# Patient Record
Sex: FEMALE | Race: WHITE | Employment: FULL TIME | URBAN - NONMETROPOLITAN AREA
[De-identification: names, ages, dates, MRNs, and addresses within clinical notes are randomized per-mention and may not be internally consistent; named-entity substitution may affect disease eponyms.]

---
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
## 2019-05-28 LAB — PHQ9 SCORE: 6

## 2019-06-27 ENCOUNTER — OFFICE VISIT (OUTPATIENT)
Dept: FAMILY MEDICINE | Facility: OTHER | Age: 19
End: 2019-06-27
Payer: COMMERCIAL

## 2019-06-27 VITALS
BODY MASS INDEX: 24.98 KG/M2 | RESPIRATION RATE: 16 BRPM | TEMPERATURE: 98.2 F | OXYGEN SATURATION: 99 % | WEIGHT: 141 LBS | DIASTOLIC BLOOD PRESSURE: 60 MMHG | HEIGHT: 63 IN | SYSTOLIC BLOOD PRESSURE: 94 MMHG | HEART RATE: 102 BPM

## 2019-06-27 DIAGNOSIS — R53.83 FATIGUE, UNSPECIFIED TYPE: ICD-10-CM

## 2019-06-27 DIAGNOSIS — Z00.00 ROUTINE GENERAL MEDICAL EXAMINATION AT A HEALTH CARE FACILITY: Primary | ICD-10-CM

## 2019-06-27 PROCEDURE — 99395 PREV VISIT EST AGE 18-39: CPT | Performed by: NURSE PRACTITIONER

## 2019-06-27 RX ORDER — OMEPRAZOLE 40 MG/1
1 CAPSULE, DELAYED RELEASE ORAL
Refills: 2 | COMMUNITY
Start: 2019-02-28

## 2019-06-27 RX ORDER — LORAZEPAM 0.5 MG/1
TABLET ORAL
Qty: 1 TABLET | Refills: 0 | COMMUNITY
Start: 2019-06-27

## 2019-06-27 ASSESSMENT — ENCOUNTER SYMPTOMS
HEARTBURN: 0
FEVER: 0
COUGH: 0
HEMATURIA: 0
DIZZINESS: 0
WEAKNESS: 0
SORE THROAT: 0
DYSURIA: 0
FREQUENCY: 0
ABDOMINAL PAIN: 0
CONSTIPATION: 0
PARESTHESIAS: 0
HEADACHES: 0
MYALGIAS: 0
HEMATOCHEZIA: 0
JOINT SWELLING: 0
NERVOUS/ANXIOUS: 0
PALPITATIONS: 0
FATIGUE: 1
DIARRHEA: 0
CHILLS: 0
EYE PAIN: 0
BREAST MASS: 0
SHORTNESS OF BREATH: 0
NAUSEA: 0
ARTHRALGIAS: 0

## 2019-06-27 ASSESSMENT — MIFFLIN-ST. JEOR: SCORE: 1375.76

## 2019-06-27 ASSESSMENT — PAIN SCALES - GENERAL: PAINLEVEL: NO PAIN (0)

## 2019-06-27 NOTE — PROGRESS NOTES
SUBJECTIVE:   CC: Loli Valverde is an 18 year old woman who presents for preventive health visit.     Healthy Habits:     Getting at least 3 servings of Calcium per day:  NO    Bi-annual eye exam:  NO    Dental care twice a year:  Yes    Sleep apnea or symptoms of sleep apnea:  Daytime drowsiness    Diet:  Regular (no restrictions)    Frequency of exercise:  None    Taking medications regularly:  Yes    Medication side effects:  None    PHQ-2 Total Score: 0    Additional concerns today:  Yes  Fatigue   Associated symptoms include fatigue. Pertinent negatives include no abdominal pain, arthralgias, chest pain, chills, congestion, coughing, fever, headaches, joint swelling, myalgias, nausea, rash, sore throat or weakness.       Feels tired a lot, sleeps a lot, wants to get a sleep study.  Sleeps 8-10 hours per night, never feels rested and will often nap during the day as well.  Snores occasionally.     Today's PHQ-2 Score:   PHQ-2 ( 2000 Pfizer) 6/27/2019   Q1: Little interest or pleasure in doing things 0   Q2: Feeling down, depressed or hopeless 0   PHQ-2 Score 0   Q1: Little interest or pleasure in doing things Not at all   Q2: Feeling down, depressed or hopeless Not at all   PHQ-2 Score 0       Abuse: Current or Past(Physical, Sexual or Emotional)- No  Do you feel safe in your environment? Yes    Social History     Tobacco Use     Smoking status: Never Smoker     Smokeless tobacco: Never Used   Substance Use Topics     Alcohol use: No     Alcohol/week: 0.0 oz         Alcohol Use 6/27/2019   Prescreen: >3 drinks/day or >7 drinks/week? Not Applicable   No flowsheet data found.    Reviewed orders with patient.  Reviewed health maintenance and updated orders accordingly - Yes  Patient Active Problem List   Diagnosis     Social phobia     Dysmenorrhea     Adjustment disorder with anxious mood     Anxiety     Past Surgical History:   Procedure Laterality Date     NO HISTORY OF SURGERY         Social History      Tobacco Use     Smoking status: Never Smoker     Smokeless tobacco: Never Used   Substance Use Topics     Alcohol use: No     Alcohol/week: 0.0 oz     Family History   Problem Relation Age of Onset     Family History Negative No family hx of          Current Outpatient Medications   Medication Sig Dispense Refill     hydrOXYzine (ATARAX) 25 MG tablet Take 0.5-1 tablets (12.5-25 mg) by mouth every 6 hours as needed for itching or anxiety 60 tablet 1     LORazepam (ATIVAN) 0.5 MG tablet  1 tablet 0     naproxen 500 MG TBEC Take 500 mg by mouth as needed 60 tablet 3     omeprazole (PRILOSEC) 40 MG DR capsule Take 1 capsule by mouth 3 times daily (before meals)  2     sertraline (ZOLOFT) 100 MG tablet Take 100 mg by mouth daily       Allergies   Allergen Reactions     No Known Drug Allergies        Mammogram not appropriate for this patient based on age.    Pertinent mammograms are reviewed under the imaging tab.  History of abnormal Pap smear: NO - under age 21, PAP not appropriate for age     Reviewed and updated as needed this visit by clinical staff  Tobacco  Allergies  Meds  Soc Hx        Reviewed and updated as needed this visit by Provider        Past Medical History:   Diagnosis Date     Adjustment disorder with anxious mood 11/13/2015     Anxiety 12/17/2015     Dysmenorrhea       Past Surgical History:   Procedure Laterality Date     NO HISTORY OF SURGERY         Review of Systems   Constitutional: Positive for fatigue. Negative for chills and fever.   HENT: Negative for congestion, ear pain, hearing loss and sore throat.    Eyes: Negative for pain and visual disturbance.   Respiratory: Negative for cough and shortness of breath.    Cardiovascular: Negative for chest pain, palpitations and peripheral edema.   Gastrointestinal: Negative for abdominal pain, constipation, diarrhea, heartburn, hematochezia and nausea.   Breasts:  Positive for tenderness. Negative for breast mass and discharge.  "  Genitourinary: Positive for vaginal discharge. Negative for dysuria, frequency, genital sores, hematuria, pelvic pain, urgency and vaginal bleeding.   Musculoskeletal: Negative for arthralgias, joint swelling and myalgias.   Skin: Negative for rash.   Neurological: Negative for dizziness, weakness, headaches and paresthesias.   Psychiatric/Behavioral: Negative for mood changes. The patient is not nervous/anxious.           OBJECTIVE:   BP 94/60   Pulse 102   Temp 98.2  F (36.8  C) (Temporal)   Resp 16   Ht 1.588 m (5' 2.5\")   Wt 64 kg (141 lb)   SpO2 99%   BMI 25.38 kg/m    Physical Exam  GENERAL: healthy, alert and no distress  EYES: Eyes grossly normal to inspection, PERRL and conjunctivae and sclerae normal  HENT: ear canals and TM's normal, nose and mouth without ulcers or lesions  NECK: no adenopathy, no asymmetry, masses, or scars and thyroid normal to palpation  RESP: lungs clear to auscultation - no rales, rhonchi or wheezes  CV: regular rate and rhythm, normal S1 S2, no S3 or S4, no murmur, click or rub, no peripheral edema and peripheral pulses strong  ABDOMEN: soft, nontender, no hepatosplenomegaly, no masses and bowel sounds normal  MS: no gross musculoskeletal defects noted, no edema  SKIN: no suspicious lesions or rashes  NEURO: Normal strength and tone, mentation intact and speech normal  PSYCH: mentation appears normal, affect normal/bright    Diagnostic Test Results:  none     ASSESSMENT/PLAN:   1. Routine general medical examination at a health care facility    2. Fatigue, unspecified type  - SLEEP EVALUATION & MANAGEMENT REFERRAL - [facility] [phone] (Age 12 if over 100 lbs); Future    COUNSELING:  Reviewed preventive health counseling, as reflected in patient instructions    Estimated body mass index is 25.38 kg/m  as calculated from the following:    Height as of this encounter: 1.588 m (5' 2.5\").    Weight as of this encounter: 64 kg (141 " lb).         reports that she has never smoked. She has never used smokeless tobacco.      Counseling Resources:  ATP IV Guidelines  Pooled Cohorts Equation Calculator  Breast Cancer Risk Calculator  FRAX Risk Assessment  ICSI Preventive Guidelines  Dietary Guidelines for Americans, 2010  USDA's MyPlate  ASA Prophylaxis  Lung CA Screening    NORY Johnson [facility]

## 2019-06-27 NOTE — PATIENT INSTRUCTIONS
Preventive Health Recommendations  Female Ages 18 to 20     Yearly exam:     See your health care provider every year in order to  o Review health changes.   o Discuss preventive care.    o Review your medicines if your doctor has prescribed any.      You should be tested each year for STDs (sexually transmitted diseases).       After age 20, talk to your provider about how often you should have cholesterol testing.      If you are at risk for diabetes, you should have a diabetes test (fasting glucose).     Shots:     Get a flu shot each year.     Get a tetanus shot every 10 years.     Consider getting the shot (vaccine) that prevents cervical cancer (Gardasil).    Nutrition:     Eat at least 5 servings of fruits and vegetables each day.    Eat whole-grain bread, whole-wheat pasta and brown rice instead of white grains and rice.    Get adequate Calcium and Vitamin D.     Lifestyle    Exercise at least 150 minutes a week each week (30 minutes a day, 5 days a week). This will help you control your weight and prevent disease.    No smoking.     Wear sunscreen to prevent skin cancer.    See your dentist every six months for an exam and cleaning.

## 2019-07-09 LAB — PHQ9 SCORE: 4

## 2019-07-23 ENCOUNTER — TELEPHONE (OUTPATIENT)
Dept: FAMILY MEDICINE | Facility: OTHER | Age: 19
End: 2019-07-23

## 2019-07-23 NOTE — TELEPHONE ENCOUNTER
Panel Management Review      Patient has the following on her problem list: None      Composite cancer screening  Chart review shows that this patient is due/due soon for the following None  Summary:    Patient is due/failing the following:   Chlamydia screening due to age.     Action needed:   Patient needs office visit for Chlamydia screening due to age.    Type of outreach:    Sent [employer] message.    Questions for provider review:    None                                                                                                                                    Iliana Do MA

## 2019-07-30 ENCOUNTER — MYC MEDICAL ADVICE (OUTPATIENT)
Dept: FAMILY MEDICINE | Facility: OTHER | Age: 19
End: 2019-07-30

## 2019-08-07 ENCOUNTER — OFFICE VISIT (OUTPATIENT)
Dept: SLEEP MEDICINE | Facility: CLINIC | Age: 19
End: 2019-08-07
Payer: COMMERCIAL

## 2019-08-07 VITALS
HEIGHT: 62 IN | WEIGHT: 145.8 LBS | SYSTOLIC BLOOD PRESSURE: 108 MMHG | HEART RATE: 83 BPM | BODY MASS INDEX: 26.83 KG/M2 | OXYGEN SATURATION: 99 % | DIASTOLIC BLOOD PRESSURE: 66 MMHG

## 2019-08-07 DIAGNOSIS — R53.83 FATIGUE, UNSPECIFIED TYPE: ICD-10-CM

## 2019-08-07 PROCEDURE — 99205 OFFICE O/P NEW HI 60 MIN: CPT | Performed by: INTERNAL MEDICINE

## 2019-08-07 ASSESSMENT — MIFFLIN-ST. JEOR: SCORE: 1389.59

## 2019-08-07 NOTE — PATIENT INSTRUCTIONS
SLEEP HYGIENE TIPS:    1. Don t go to bed unless you are sleepy.   If you are not sleepy at bedtime, then do something else. Read a book, listen to soft music or browse through a magazine. Find something relaxing, but not stimulating, to take your mind off of worries about sleep. This will relax your body and distract your mind.     2. If you are not asleep after 20 minutes, then get out of the bed.   Find something else to do that will make you feel relaxed. If you can, do this in another room. Your bedroom should be where you go to sleep. It is not a place to go when you are bored. Once you feel sleepy again, go back to bed.     3. Begin rituals that help you relax each night before bed.   This can include such things as a warm bath, light snack or a few minutes of reading.     4. Get up at the same time every morning.   Do this even on weekends and holidays.     5. Get a full night s sleep on a regular basis.   Get enough sleep so that you feel well-rested nearly every day.     6. Avoid taking naps if you can.   If you must take a nap, try to keep it short (less than one hour). Never take a nap after 3 p.m.     7. Keep a regular schedule.   Regular times for meals, medications, chores, and other activities help keep the inner body clock running smoothly.     8. Don t read, write, eat, watch TV, talk on the phone, or play cards in bed.     9. Do not have any caffeine after lunch.     10. Do not have a beer, a glass of wine, or any other alcohol within six hours of your bedtime.     11. Do not have a cigarette or any other source of nicotine before bedtime.     12. Do not go to bed hungry, but don t eat a big meal near bedtime either.     13. Avoid any tough exercise within six hours of your bedtime.   You should exercise on a regular basis, but do it earlier in the day. (Talk to your doctor before you begin an exercise program.)     14. Avoid sleeping pills, or use them cautiously.   Most doctors do not prescribe  sleeping pills for periods of more than three weeks. Do not drink alcohol while taking sleeping pills.     15. Try to get rid of or deal with things that make you worry.   If you are unable to do this, then find a time during the day to get all of your worries out of your system. Your bed is a place to rest, not a place to worry.     16. Make your bedroom quiet, dark, and a little bit cool.   An easy way to remember this: it should remind you of a cave. While this may not sound romantic, it seems to work for bats. Bats are champion sleepers. They get about 16 hours of sleep each day. Maybe it s because they sleep in dark, cool caves.

## 2019-08-07 NOTE — PROGRESS NOTES
SLEEP MEDICINE CLINIC NOTE   [facility]  Loli Valverde 18 year old female  : 2000  MRN: [id number]      PRIMARY CARE PROVIDER: Georges Agarwal    REFERRING PROVIDER: NORY Johnson CNP  [address] [place] [address]    Visit Date:   2019      REASON FOR VISIT:  Evaluation of excessive daytime sleepiness.      HISTORY OF PRESENT ILLNESS:  The patient is a very pleasant 18-year-old female with history of significant anxiety disorder, agoraphobia, dysmenorrhea, adjustment disorder with anxious mood who is seen in clinic for evaluation of excessive daytime sleepiness.  She reports that she has been experiencing these symptoms for the last 2 years.      She describes her current routine as going to sleep around 11:00 p.m.  It usually takes her just a few minutes to fall asleep.  She reports waking up 3-4 times briefly mostly spontaneously to reposition herself throughout the night.  It is easy for her to return to sleep.  She awakes spontaneously between 8:00 and 9:00 a.m.  She feels somewhat refreshed from sleep in the morning.  She reports taking at least 1 nap each afternoon between 2:00 and 3:00 p.m. lasting for about 2 hours.  This happens about 3-4 times per week.  The patient reports that while she is in school, she goes to bed at 11:00 and wakes up with the help of an alarm at 7:00 a.m.  She is less sleepy during the school year.  She is currently in college, but now on summer break.      The patient reports that she spends most of her day and night in her bedroom sitting in her bed either watching TV on a big computer monitor or working on her laptop or doing art or video chatting with her boyfriend.  She eats in her bed as well and usually does not leave her room  most of the time.  She only goes out of her house on weekends to shop occasionally.      She considers herself a night owl.  She denies any features of motor restlessness suggestive of restless  legs syndrome.  She denies any frequent dreams, nightmares.  She does report dreams about anxiety-provoking situations.  She denies dream enactment behavior or other forms of parasomnias.  She does not have bruxism.      She denies waking up with a headache in the morning.  She does not snore.  She does not have snort arousals or witnessed apneas.  She often wakes up with a dry mouth.  She does report difficulty breathing through her nose.  She has significant GERD.  She prefers to sleep on side, back or in prone position.      She denies any features of hypocretin deficiency including cataplexy, sleep paralysis or hypnagogic or hypnopompic hallucinations.  She describes her [place] Sleepiness score as 10-12 with no chances of falling asleep while driving.  The patient does not regularly drive.  She is taking her 's license test this weekend.      SOCIAL HISTORY:  The patient is single, lives at home with her parents and her sister.  She is currently in college but on summer break.  She denies any smoking, alcohol or illicit drug use.      FAMILY HISTORY:  She reports that her father was diagnosed with obstructive sleep apnea in his 40s and currently uses a CPAP.  He is not obese.  No other major medical problems in the family.      PAST SURGICAL HISTORY:  None.      PAST MEDICAL HISTORY:  Includes anxiety, agoraphobia, adjustment disorder, GERD and tension headaches.      MEDICATIONS:  Sertraline, prescribed in 2017 for anxiety.  Prior to that, she had been on Wellbutrin, but this was discontinued as sertraline was thought to be more helpful.  She takes Naprosyn for dysmenorrhea, lorazepam for anxiety.  Currently, she is using it once every other week; however, during school year she would use it more frequently.  She also has hydroxyzine for anxiety, acetaminophen as needed for pain, and omeprazole for GERD, but she does not usually take it.  She was treated with birth control medications for significant  "dysmenorrhea but had significant worsening of anxiety, decreased appetite and weight loss with those; therefore, they were discontinued.      REVIEW OF SYSTEMS:  Significant only for generalized anxiety and occasional headaches.  The rest of the review of systems is negative.      PHYSICAL EXAMINATION:   /66   Pulse 83   Ht 1.575 m (5' 2\")   Wt 66.1 kg (145 lb 12.8 oz)   SpO2 99%   BMI 26.67 kg/m    GENERAL:  A pleasant female sitting comfortably, speaking in full sentences without any discomfort.   HEENT:  Neck circumference 30 cm.  Mallampati class II airway with a large tongue and prominent peripheral scalloping.  No malocclusion noted.  Tonsils not visualized.  No obstruction to flow in the nares.  No cervical or submandibular lymphadenopathy.   PULMONARY:  Normal intensity breath sounds bilaterally with no added sounds.   CARDIOVASCULAR:  S1, S2 normal, no murmur, rubs or gallops.  Regular rate and rhythm.   ABDOMEN:  Soft, nontender, nondistended, normoactive bowel sounds.   NEUROLOGIC:  Grossly intact.   PSYCHIATRIC:  Normal affect.   SKIN:  No rashes seen on limited exam.   MUSCULOSKELETAL:  No lower extremity edema or upper extremity tremors.      ASSESSMENT AND PLAN:  The patient is a very pleasant 18-year-old female who is being seen in clinic for daytime fatigue and possible excessive daytime sleepiness.  Upon reviewing her symptoms and history, it appears that she is more fatigued and excessively sleepy.  She does not have chronic sleep deprivation as she appears to be getting anywhere from 10-12 hours of sleep during her summer break and 8 hours of sleep during the school year on a daily basis.  Although she has family history of sleep apnea and somewhat crowded airway, overall pretest probability for obstructive sleep apnea is very low.  She does not have any features of hypocretin deficiency suggestive of narcolepsy or other symptoms of idiopathic hypersomnolence.  She does have possible " circadian delay, but overall it appears that the primary cause of her daytime fatigue and excessive sleepiness is very poor sleep hygiene and likely inadequately treated anxiety.  The patient agrees with this assessment.  I suggested improving her sleep hygiene by not spending any time in bed during the day or any more time than what she needs to sleep.  She was suggested to retire in bed around 11:00 p.m. and avoid any screen exposure or eating in bed.  She was suggested to set up an alarm for 7:00 a.m. and get out of bed at that time and not return to her bedroom unless she needs to take a very short up to 30-minute nap during the afternoon, which she should interrupt with an alarm.  She was advised to expose herself to bright light, particularly sunlight and be engaged in physical activities through the day.  She was also counseled to discuss with her psychiatrist regarding benefits of cognitive behavioral therapy for treatment of anxiety as well as other measures.  She will also discuss with her primary psychiatrist regarding benefits of using Wellbutrin instead of sertraline as she felt that she was more sleepy while taking sertraline.  She was advised to avoid taking lorazepam and hydroxyzine if possible.      The patient was advised not to drive or operate heavy machinery if drowsy or sleepy.  She was counseled regarding the importance of weight loss.  There is no further sleep consultation or testing required.  The patient was provided with measures to improve her sleep hygiene.  She can return to clinic on an as needed basis.      I spent a total of 60 minutes face to face with Loli Valverde during today's office visit. Over 50% of this time was spent counseling the patient and/or coordinating care regarding their sleep disorder.     Sanchez Diehl MD   of Medicine  Pulmonary, Critical Care and Sleep Medicine  [facility]  Pager: [phone]              D: 08/07/2019   T:  2019   MT:       Name:     RICKY LOAIZA   MRN:      [id number]        Account:      [id number]   :      2000           Visit Date:   2019      Document: [id number]

## 2019-09-27 ENCOUNTER — HEALTH MAINTENANCE LETTER (OUTPATIENT)
Age: 19
End: 2019-09-27

## 2020-01-02 ENCOUNTER — TRANSFERRED RECORDS (OUTPATIENT)
Dept: HEALTH INFORMATION MANAGEMENT | Facility: CLINIC | Age: 20
End: 2020-01-02

## 2020-01-02 LAB — PHQ9 SCORE: 3

## 2020-05-27 ENCOUNTER — VIRTUAL VISIT (OUTPATIENT)
Dept: URGENT CARE | Facility: CLINIC | Age: 20
End: 2020-05-27
Payer: COMMERCIAL

## 2020-05-27 ENCOUNTER — NURSE TRIAGE (OUTPATIENT)
Dept: NURSING | Facility: CLINIC | Age: 20
End: 2020-05-27

## 2020-05-27 DIAGNOSIS — N30.01 ACUTE CYSTITIS WITH HEMATURIA: Primary | ICD-10-CM

## 2020-05-27 PROCEDURE — 99213 OFFICE O/P EST LOW 20 MIN: CPT | Mod: 95 | Performed by: STUDENT IN AN ORGANIZED HEALTH CARE EDUCATION/TRAINING PROGRAM

## 2020-05-27 RX ORDER — PHENAZOPYRIDINE HYDROCHLORIDE 95 MG/1
190 TABLET ORAL 3 TIMES DAILY
Qty: 30 TABLET | Refills: 0 | Status: SHIPPED | OUTPATIENT
Start: 2020-05-27 | End: 2020-06-01

## 2020-05-27 RX ORDER — NITROFURANTOIN 25; 75 MG/1; MG/1
100 CAPSULE ORAL 2 TIMES DAILY
Qty: 10 CAPSULE | Refills: 0 | Status: SHIPPED | OUTPATIENT
Start: 2020-05-27 | End: 2020-06-01

## 2020-05-27 NOTE — TELEPHONE ENCOUNTER
She has pain and frequency with urination and blood in the urine this morning. I connected with scheduling for a telephone visit today. The phone wouldn't connect so I gave her the phone number to call and tell the  that we ran into phone problems and she needs an appointment for today.  Ching Samaniego RN  [facility]      Additional Information    Negative: Shock suspected (e.g., cold/pale/clammy skin, too weak to stand, low BP, rapid pulse)    Negative: Sounds like a life-threatening emergency to the triager    Negative: Followed a genital area injury    Negative: Taking antibiotic for urinary tract infection (UTI)    Negative: Pregnant    Negative: Postpartum (from 0 to 6 weeks after delivery)    Negative: [1] Unable to urinate (or only a few drops) > 4 hours AND [2] bladder feels very full (e.g., palpable bladder or strong urge to urinate)    Negative: Vomiting    Negative: Patient sounds very sick or weak to the triager    Negative: [1] SEVERE pain with urination  (e.g., excruciating) AND [2] not improved after 2 hours of pain medicine and Sitz bath     Pain at 4    Negative: Fever > 100.5 F (38.1 C)    Negative: Side (flank) or lower back pain present    Negative: Diabetes mellitus or weak immune system (e.g., HIV positive, cancer chemo, splenectomy, organ transplant, chronic steroids)    Negative: Bedridden (e.g., nursing home patient, CVA, chronic illness, recovering from surgery)    Negative: Artificial heart valve or artificial joint    Negative: Unusual vaginal discharge (e.g., bad smelling, yellow, green, or foamy-white)    Negative: Patient is worried about sexually transmitted disease (STD)    Negative: Possibility of pregnancy    Blood in urine (red, pink, or tea-colored)    Protocols used: URINATION PAIN - FEMALE-A-AH

## 2020-05-27 NOTE — PROGRESS NOTES
"The patient has been notified of following:     \"This telephone visit will be conducted via a call between you and your physician/provider. We have found that certain health care needs can be provided without the need for a physical exam.  This service lets us provide the care you need with a short phone conversation.  If a prescription is necessary we can send it directly to your pharmacy.      Telephone visits are billed at different rates depending on your insurance coverage. During this emergency period, for some insurers they may be billed the same as an in-person visit.  Please reach out to your insurance provider with any questions.    If during the course of the call the physician/provider feels a telephone visit is not appropriate, you will not be charged for this service.\"    Patient has given verbal consent for Telephone visit?  Yes      Subjective     CC: Loli Valverde  is a 19 year old female who presents to clinic today for the following health issues:   Chief Complaint   Patient presents with     Cystitis      19yoF, otherwise healthy, called about hematuria. Onset of sxs was 2 days ago, c/o yessica blood in the urine and on toilet paper, dysuria, urinary urgency and frequency. Denies flank pain, abd pain, diarrhea, or fever. LMP was 1 week ago. Has one male sexual partner, uses condoms, no hx of STIs, no vaginal sxs. Has had one prior episode of dysuria a few months ago but spontaneously resolved after one day w/ no medication.     Patient Active Problem List   Diagnosis     Social phobia     Dysmenorrhea     Adjustment disorder with anxious mood     Anxiety     Current Outpatient Medications   Medication     hydrOXYzine (ATARAX) 25 MG tablet     LORazepam (ATIVAN) 0.5 MG tablet     naproxen 500 MG TBEC     omeprazole (PRILOSEC) 40 MG DR capsule     sertraline (ZOLOFT) 100 MG tablet     No current facility-administered medications for this visit.         Allergies   Allergen Reactions     No Known " Drug Allergies        Reviewed and updated as needed this visit by Provider    Review of Systems   Constitutional, HEENT, cardiovascular, pulmonary, gi and gu systems are negative, except as otherwise noted.      Objective    Gen: Patient is alert, oriented  Resp: Speaking full sentence, no audible shortness of breath.  No cough of wheeze.          Assessment/Plan:  1. Acute cystitis with hematuria  Low concern for complicated cystitis, no risk factors or systemic sxs. No vaginal sxs  - nitrofurantoin bid x 5 days  - Azo for sxs relief    Phone call duration:  15 minutes    Pt discussed with Dr. Ion Sánchez MD   Internal Medicine PGY2  05/27/209:36 AM

## 2020-05-27 NOTE — PROGRESS NOTES
I was present during the key/critical portion of the telephone visit. The patient acknowledged that I participated in the telephone conversation. I discussed the case with the resident and agree with the note as documented by the resident.    I personally spent a total of 6 minutes speaking with Loli Valverde during today s visit.     Kenneth Lemon MD  5/27/2020 at 5:10 PM

## 2020-06-18 ENCOUNTER — TRANSFERRED RECORDS (OUTPATIENT)
Dept: HEALTH INFORMATION MANAGEMENT | Facility: CLINIC | Age: 20
End: 2020-06-18

## 2020-11-04 ENCOUNTER — OFFICE VISIT (OUTPATIENT)
Dept: FAMILY MEDICINE | Facility: CLINIC | Age: 20
End: 2020-11-04
Payer: COMMERCIAL

## 2020-11-04 VITALS
DIASTOLIC BLOOD PRESSURE: 64 MMHG | OXYGEN SATURATION: 100 % | HEART RATE: 66 BPM | WEIGHT: 148 LBS | BODY MASS INDEX: 27.23 KG/M2 | SYSTOLIC BLOOD PRESSURE: 106 MMHG | HEIGHT: 62 IN

## 2020-11-04 DIAGNOSIS — N94.6 PAINFUL MENSTRUAL PERIODS: ICD-10-CM

## 2020-11-04 DIAGNOSIS — Z11.4 SCREENING FOR HIV (HUMAN IMMUNODEFICIENCY VIRUS): ICD-10-CM

## 2020-11-04 DIAGNOSIS — Z11.59 NEED FOR HEPATITIS C SCREENING TEST: ICD-10-CM

## 2020-11-04 DIAGNOSIS — F41.1 GAD (GENERALIZED ANXIETY DISORDER): ICD-10-CM

## 2020-11-04 DIAGNOSIS — Z23 NEED FOR PROPHYLACTIC VACCINATION AND INOCULATION AGAINST INFLUENZA: ICD-10-CM

## 2020-11-04 DIAGNOSIS — Z00.00 ROUTINE GENERAL MEDICAL EXAMINATION AT A HEALTH CARE FACILITY: Primary | ICD-10-CM

## 2020-11-04 DIAGNOSIS — Z11.3 SCREENING FOR STDS (SEXUALLY TRANSMITTED DISEASES): ICD-10-CM

## 2020-11-04 DIAGNOSIS — N94.6 DYSMENORRHEA: ICD-10-CM

## 2020-11-04 DIAGNOSIS — Z12.4 SCREENING FOR MALIGNANT NEOPLASM OF CERVIX: ICD-10-CM

## 2020-11-04 PROCEDURE — 99395 PREV VISIT EST AGE 18-39: CPT | Mod: 25 | Performed by: FAMILY MEDICINE

## 2020-11-04 PROCEDURE — 90686 IIV4 VACC NO PRSV 0.5 ML IM: CPT | Performed by: FAMILY MEDICINE

## 2020-11-04 PROCEDURE — 90471 IMMUNIZATION ADMIN: CPT | Performed by: FAMILY MEDICINE

## 2020-11-04 RX ORDER — NAPROXEN 500 MG/1
500 TABLET ORAL PRN
Qty: 60 TABLET | Refills: 3 | Status: SHIPPED | OUTPATIENT
Start: 2020-11-04

## 2020-11-04 ASSESSMENT — ENCOUNTER SYMPTOMS
FREQUENCY: 0
HEARTBURN: 0
SHORTNESS OF BREATH: 0
NERVOUS/ANXIOUS: 0
COUGH: 0
HEMATURIA: 0
MYALGIAS: 0
JOINT SWELLING: 0
PALPITATIONS: 0
EYE PAIN: 0
HEADACHES: 1
PARESTHESIAS: 0
FEVER: 0
DIZZINESS: 0
SORE THROAT: 0
CONSTIPATION: 0
ARTHRALGIAS: 0
WEAKNESS: 0
DIARRHEA: 0
DYSURIA: 0
ABDOMINAL PAIN: 0
BREAST MASS: 0
HEMATOCHEZIA: 0
CHILLS: 0

## 2020-11-04 ASSESSMENT — ANXIETY QUESTIONNAIRES
GAD7 TOTAL SCORE: 2
1. FEELING NERVOUS, ANXIOUS, OR ON EDGE: SEVERAL DAYS
IF YOU CHECKED OFF ANY PROBLEMS ON THIS QUESTIONNAIRE, HOW DIFFICULT HAVE THESE PROBLEMS MADE IT FOR YOU TO DO YOUR WORK, TAKE CARE OF THINGS AT HOME, OR GET ALONG WITH OTHER PEOPLE: NOT DIFFICULT AT ALL
6. BECOMING EASILY ANNOYED OR IRRITABLE: SEVERAL DAYS
3. WORRYING TOO MUCH ABOUT DIFFERENT THINGS: NOT AT ALL
5. BEING SO RESTLESS THAT IT IS HARD TO SIT STILL: NOT AT ALL
2. NOT BEING ABLE TO STOP OR CONTROL WORRYING: NOT AT ALL
7. FEELING AFRAID AS IF SOMETHING AWFUL MIGHT HAPPEN: NOT AT ALL

## 2020-11-04 ASSESSMENT — PATIENT HEALTH QUESTIONNAIRE - PHQ9
5. POOR APPETITE OR OVEREATING: NOT AT ALL
SUM OF ALL RESPONSES TO PHQ QUESTIONS 1-9: 6

## 2020-11-04 ASSESSMENT — MIFFLIN-ST. JEOR: SCORE: 1389.57

## 2020-11-04 NOTE — PROGRESS NOTES
SUBJECTIVE:   CC: Loli Valverde is an 20 year old woman who presents for preventive health visit.       Patient has been advised of split billing requirements and indicates understanding: Yes  Healthy Habits:     Getting at least 3 servings of Calcium per day:  NO    Bi-annual eye exam:  Yes    Dental care twice a year:  Yes    Sleep apnea or symptoms of sleep apnea:  Daytime drowsiness    Diet:  Regular (no restrictions) and Breakfast skipped    Frequency of exercise:  None    Taking medications regularly:  Yes    Medication side effects:  None    PHQ-2 Total Score: 1    Additional concerns today:  No    Never had pap in the past  Periods are regular    Sexually active with one partner  Using condoms at all times    Sees a psychiatrics for anxety    Flu shot at work      Cramps for periods-she takes 6 pills 2-3 days of periods  Along with tylenol        Today's PHQ-2 Score:   PHQ-2 ( 2000 Pfizer) 11/4/2020   Q1: Little interest or pleasure in doing things 1   Q2: Feeling down, depressed or hopeless 0   PHQ-2 Score 1   Q1: Little interest or pleasure in doing things Several days   Q2: Feeling down, depressed or hopeless Not at all   PHQ-2 Score 1       Abuse: Current or Past (Physical, Sexual or Emotional) - No  Do you feel safe in your environment? Yes        Social History     Tobacco Use     Smoking status: Never Smoker     Smokeless tobacco: Never Used   Substance Use Topics     Alcohol use: No     Alcohol/week: 0.0 standard drinks         Alcohol Use 11/4/2020   Prescreen: >3 drinks/day or >7 drinks/week? No   Prescreen: >3 drinks/day or >7 drinks/week? -       Reviewed orders with patient.  Reviewed health maintenance and updated orders accordingly - Yes  BP Readings from Last 3 Encounters:   11/04/20 106/64   08/07/19 108/66   06/27/19 94/60    Wt Readings from Last 3 Encounters:   11/04/20 67.1 kg (148 lb)   08/07/19 66.1 kg (145 lb 12.8 oz) (76 %, Z= 0.70)*   06/27/19 64 kg (141 lb) (70 %, Z= 0.54)*      * Growth percentiles are based on CDC (Girls, 2-20 Years) data.                    Mammogram not appropriate for this patient based on age.    Pertinent mammograms are reviewed under the imaging tab.  History of abnormal Pap smear: NO - age 21-29 PAP every 3 years recommended     Reviewed and updated as needed this visit by clinical staff   Allergies  Meds              Reviewed and updated as needed this visit by Provider                Past Medical History:   Diagnosis Date     Adjustment disorder with anxious mood 11/13/2015     Anxiety 12/17/2015     Dysmenorrhea       Past Surgical History:   Procedure Laterality Date     NO HISTORY OF SURGERY       OB History   No obstetric history on file.       Review of Systems   Constitutional: Negative for chills and fever.   HENT: Negative for congestion, ear pain, hearing loss and sore throat.    Eyes: Negative for pain and visual disturbance.   Respiratory: Negative for cough and shortness of breath.    Cardiovascular: Negative for chest pain, palpitations and peripheral edema.   Gastrointestinal: Negative for abdominal pain, constipation, diarrhea, heartburn and hematochezia.   Breasts:  Negative for tenderness, breast mass and discharge.   Genitourinary: Positive for vaginal discharge. Negative for dysuria, frequency, genital sores, hematuria, pelvic pain, urgency and vaginal bleeding.   Musculoskeletal: Negative for arthralgias, joint swelling and myalgias.   Skin: Negative for rash.   Neurological: Positive for headaches. Negative for dizziness, weakness and paresthesias.   Psychiatric/Behavioral: Negative for mood changes. The patient is not nervous/anxious.      CONSTITUTIONAL: NEGATIVE for fever, chills, change in weight  INTEGUMENTARU/SKIN: NEGATIVE for worrisome rashes, moles or lesions  EYES: NEGATIVE for vision changes or irritation  ENT: NEGATIVE for ear, mouth and throat problems  RESP: NEGATIVE for significant cough or SOB  BREAST: NEGATIVE for  "masses, tenderness or discharge  CV: NEGATIVE for chest pain, palpitations or peripheral edema  GI: NEGATIVE for nausea, abdominal pain, heartburn, or change in bowel habits  : NEGATIVE for unusual urinary or vaginal symptoms. Periods are regular.  MUSCULOSKELETAL: NEGATIVE for significant arthralgias or myalgia  NEURO: NEGATIVE for weakness, dizziness or paresthesias  PSYCHIATRIC: NEGATIVE for changes in mood or affect     OBJECTIVE:   /64   Pulse 66   Ht 1.575 m (5' 2\")   Wt 67.1 kg (148 lb)   LMP 10/28/2020   SpO2 100%   BMI 27.07 kg/m    Physical Exam  GENERAL: healthy, alert and no distress  EYES: Eyes grossly normal to inspection, PERRL and conjunctivae and sclerae normal  HENT: ear canals and TM's normal, nose and mouth without ulcers or lesions  NECK: no adenopathy, no asymmetry, masses, or scars and thyroid normal to palpation  RESP: lungs clear to auscultation - no rales, rhonchi or wheezes  BREAST: declined   CV: regular rate and rhythm, normal S1 S2, no S3 or S4, no murmur, click or rub, no peripheral edema and peripheral pulses strong  ABDOMEN: soft, nontender, no hepatosplenomegaly, no masses and bowel sounds normal   -declined  MS: no gross musculoskeletal defects noted, no edema  SKIN: no suspicious lesions or rashes  NEURO: Normal strength and tone, mentation intact and speech normal  PSYCH: mentation appears normal, affect normal/bright    Diagnostic Test Results:  Labs reviewed in Epic    ASSESSMENT/PLAN:       ICD-10-CM    1. Routine general medical examination at a health care facility  Z00.00    2. Painful menstrual periods  N94.6    3. Screening for STDs (sexually transmitted diseases)  Z11.3    4. Dysmenorrhea  N94.6 naproxen (NAPROSYN DR) 500 MG EC tablet   5. Screening for HIV (human immunodeficiency virus)  Z11.4    6. Need for hepatitis C screening test  Z11.59    7. Screening for malignant neoplasm of cervix  Z12.4    8. Need for prophylactic vaccination and inoculation " "against influenza  Z23 INFLUENZA VACCINE IM > 6 MONTHS VALENT IIV4 [82008]   9. JANNIE (generalized anxiety disorder)  F41.1    new patient to this new provider  History of painful periods-wants nepraxon prn, only 4-6 monthly  History of anxiety-sees psych stable  Declined pelvic and breast exam  Declined std check  Will return for pap      Patient has been advised of split billing requirements and indicates understanding: Yes  COUNSELING:  Reviewed preventive health counseling, as reflected in patient instructions    Estimated body mass index is 27.07 kg/m  as calculated from the following:    Height as of this encounter: 1.575 m (5' 2\").    Weight as of this encounter: 67.1 kg (148 lb).    Weight management plan: Discussed healthy diet and exercise guidelines    She reports that she has never smoked. She has never used smokeless tobacco.      Counseling Resources:  ATP IV Guidelines  Pooled Cohorts Equation Calculator  Breast Cancer Risk Calculator  BRCA-Related Cancer Risk Assessment: FHS-7 Tool  FRAX Risk Assessment  ICSI Preventive Guidelines  Dietary Guidelines for Americans, 2010  USDA's MyPlate  ASA Prophylaxis  Lung CA Screening    DO LUIS MIGUEL Ramirez [facility]  "

## 2020-11-04 NOTE — PATIENT INSTRUCTIONS
Flu shot    Patient Education         Preventive Health Recommendations  Female Ages 21 to 25     Yearly exam:     See your health care provider every year in order to  o Review health changes.   o Discuss preventive care.    o Review your medicines if your doctor has prescribed any.      You should be tested each year for STDs (sexually transmitted diseases).       Talk to your provider about how often you should have cholesterol testing.      Get a Pap test every three years. If you have an abnormal result, your doctor may have you test more often.      If you are at risk for diabetes, you should have a diabetes test (fasting glucose).     Shots:     Get a flu shot each year.     Get a tetanus shot every 10 years.     Consider getting the shot (vaccine) that prevents cervical cancer (Gardasil).    Nutrition:     Eat at least 5 servings of fruits and vegetables each day.    Eat whole-grain bread, whole-wheat pasta and brown rice instead of white grains and rice.    Get adequate Calcium and Vitamin D.     Lifestyle    Exercise at least 150 minutes a week each week (30 minutes a day, 5 days a week). This will help you control your weight and prevent disease.    Limit alcohol to one drink per day.    No smoking.     Wear sunscreen to prevent skin cancer.    See your dentist every six months for an exam and cleaning.

## 2020-11-05 ASSESSMENT — ANXIETY QUESTIONNAIRES: GAD7 TOTAL SCORE: 2

## 2020-12-10 ENCOUNTER — TRANSFERRED RECORDS (OUTPATIENT)
Dept: HEALTH INFORMATION MANAGEMENT | Facility: CLINIC | Age: 20
End: 2020-12-10

## 2021-01-09 ENCOUNTER — HEALTH MAINTENANCE LETTER (OUTPATIENT)
Age: 21
End: 2021-01-09

## 2021-10-23 ENCOUNTER — HEALTH MAINTENANCE LETTER (OUTPATIENT)
Age: 21
End: 2021-10-23

## 2021-12-18 ENCOUNTER — HEALTH MAINTENANCE LETTER (OUTPATIENT)
Age: 21
End: 2021-12-18

## 2022-04-13 DIAGNOSIS — N94.6 DYSMENORRHEA: ICD-10-CM

## 2022-04-14 RX ORDER — NAPROXEN 500 MG/1
TABLET ORAL
Qty: 60 TABLET | Refills: 3 | OUTPATIENT
Start: 2022-04-14

## 2022-04-14 NOTE — TELEPHONE ENCOUNTER
Routing refill request to provider for review/approval because:  Labs not current  Needs visit with PCP      Pending Prescriptions:                       Disp   Refills    naproxen (EC-NAPROSYN) 500 MG EC tablet [P*60 tab*3        Sig: TAKE 1 TABLET BY MOUTH AS NEEDED AND AS DIRECTED      Kiersten Pete RN

## 2022-04-18 ENCOUNTER — TELEPHONE (OUTPATIENT)
Dept: FAMILY MEDICINE | Facility: CLINIC | Age: 22
End: 2022-04-18
Payer: COMMERCIAL

## 2022-04-18 NOTE — TELEPHONE ENCOUNTER
Patient Quality Outreach    Patient is due for the following:   Cervical Cancer Screening - PAP Needed  Physical  - Due after overdue    NEXT STEPS:   Schedule a yearly physical    Type of outreach:    Sent IQcard message.      Questions for provider review:    None     Dominique Muniz CMA

## 2022-04-18 NOTE — LETTER
[facility]  [address] [id number] [phone]                                                                                                June 13, 2022    Loli Valverde  [address] [address] [place] [id number]        Dear Ms. Valverde,    At [facility] we care about your health and well-being. A review of your chart has indicated that you are due for a(n) Pap and physical examPlease contact us at [phone] to schedule your appointment.     If you have already had one or all of the above screening tests at another facility, please call us to update your chart.     You may contact the clinic at [phone] if you have any questions or concerns about this request.      Sincerely,      Your Care Team

## 2022-04-21 NOTE — TELEPHONE ENCOUNTER
Called patient and she is no longer coming to [place]. She is going to an [facility] now.    ANITRA Spear  [facility]

## 2022-10-09 ENCOUNTER — HEALTH MAINTENANCE LETTER (OUTPATIENT)
Age: 22
End: 2022-10-09

## 2023-02-12 ENCOUNTER — HEALTH MAINTENANCE LETTER (OUTPATIENT)
Age: 23
End: 2023-02-12

## 2024-03-16 ENCOUNTER — HEALTH MAINTENANCE LETTER (OUTPATIENT)
Age: 24
End: 2024-03-16

## 2024-07-18 NOTE — NURSING NOTE
Does Loli have a CPAP/Bipap?  No     Weight management plan: Patient was referred to their PCP to discuss a diet and exercise plan.   Quality 226: Preventive Care And Screening: Tobacco Use: Screening And Cessation Intervention: Patient screened for tobacco use and is an ex/non-smoker Detail Level: Detailed Quality 130: Documentation Of Current Medications In The Medical Record: Current Medications Documented [facility]